# Patient Record
Sex: FEMALE
[De-identification: names, ages, dates, MRNs, and addresses within clinical notes are randomized per-mention and may not be internally consistent; named-entity substitution may affect disease eponyms.]

---

## 2017-12-26 ENCOUNTER — HOSPITAL ENCOUNTER (OUTPATIENT)
Dept: HOSPITAL 5 - LAB | Age: 27
Discharge: HOME | End: 2017-12-26
Attending: OBSTETRICS & GYNECOLOGY
Payer: MEDICAID

## 2017-12-26 VITALS — SYSTOLIC BLOOD PRESSURE: 126 MMHG | DIASTOLIC BLOOD PRESSURE: 67 MMHG

## 2017-12-26 DIAGNOSIS — Z87.891: ICD-10-CM

## 2017-12-26 DIAGNOSIS — Z3A.30: ICD-10-CM

## 2017-12-26 DIAGNOSIS — O36.0130: Primary | ICD-10-CM

## 2017-12-26 PROCEDURE — 86901 BLOOD TYPING SEROLOGIC RH(D): CPT

## 2017-12-26 PROCEDURE — 86900 BLOOD TYPING SEROLOGIC ABO: CPT

## 2017-12-26 PROCEDURE — 86850 RBC ANTIBODY SCREEN: CPT

## 2017-12-26 PROCEDURE — 96372 THER/PROPH/DIAG INJ SC/IM: CPT

## 2018-03-08 ENCOUNTER — HOSPITAL ENCOUNTER (OUTPATIENT)
Dept: HOSPITAL 5 - TRG | Age: 28
LOS: 1 days | Discharge: HOME | End: 2018-03-09
Attending: OBSTETRICS & GYNECOLOGY
Payer: MEDICAID

## 2018-03-08 DIAGNOSIS — Z3A.40: ICD-10-CM

## 2018-03-08 DIAGNOSIS — O48.0: Primary | ICD-10-CM

## 2018-03-08 PROCEDURE — 96360 HYDRATION IV INFUSION INIT: CPT

## 2018-03-08 PROCEDURE — 59025 FETAL NON-STRESS TEST: CPT

## 2018-03-09 VITALS — SYSTOLIC BLOOD PRESSURE: 122 MMHG | DIASTOLIC BLOOD PRESSURE: 77 MMHG

## 2018-03-13 ENCOUNTER — HOSPITAL ENCOUNTER (INPATIENT)
Dept: HOSPITAL 5 - LD | Age: 28
LOS: 2 days | Discharge: HOME | End: 2018-03-15
Attending: OBSTETRICS & GYNECOLOGY | Admitting: OBSTETRICS & GYNECOLOGY
Payer: MEDICAID

## 2018-03-13 DIAGNOSIS — O48.0: Primary | ICD-10-CM

## 2018-03-13 DIAGNOSIS — J45.909: ICD-10-CM

## 2018-03-13 DIAGNOSIS — Z3A.41: ICD-10-CM

## 2018-03-13 DIAGNOSIS — A60.00: ICD-10-CM

## 2018-03-13 LAB
BASOPHILS # (AUTO): 0.1 K/MM3 (ref 0–0.1)
BASOPHILS NFR BLD AUTO: 0.7 % (ref 0–1.8)
EOSINOPHIL # BLD AUTO: 0.3 K/MM3 (ref 0–0.4)
EOSINOPHIL NFR BLD AUTO: 2.9 % (ref 0–4.3)
HCT VFR BLD CALC: 31.7 % (ref 30.3–42.9)
HGB BLD-MCNC: 10.1 GM/DL (ref 10.1–14.3)
LYMPHOCYTES # BLD AUTO: 1.8 K/MM3 (ref 1.2–5.4)
LYMPHOCYTES NFR BLD AUTO: 16 % (ref 13.4–35)
MCH RBC QN AUTO: 25 PG (ref 28–32)
MCHC RBC AUTO-ENTMCNC: 32 % (ref 30–34)
MCV RBC AUTO: 79 FL (ref 79–97)
MONOCYTES # (AUTO): 1 K/MM3 (ref 0–0.8)
MONOCYTES % (AUTO): 9.2 % (ref 0–7.3)
PLATELET # BLD: 221 K/MM3 (ref 140–440)
RBC # BLD AUTO: 4.03 M/MM3 (ref 3.65–5.03)

## 2018-03-13 PROCEDURE — 86592 SYPHILIS TEST NON-TREP QUAL: CPT

## 2018-03-13 PROCEDURE — 85025 COMPLETE CBC W/AUTO DIFF WBC: CPT

## 2018-03-13 PROCEDURE — 85014 HEMATOCRIT: CPT

## 2018-03-13 PROCEDURE — G0463 HOSPITAL OUTPT CLINIC VISIT: HCPCS

## 2018-03-13 PROCEDURE — 99211 OFF/OP EST MAY X REQ PHY/QHP: CPT

## 2018-03-13 PROCEDURE — 85018 HEMOGLOBIN: CPT

## 2018-03-13 PROCEDURE — A6250 SKIN SEAL PROTECT MOISTURIZR: HCPCS

## 2018-03-13 PROCEDURE — 86901 BLOOD TYPING SEROLOGIC RH(D): CPT

## 2018-03-13 PROCEDURE — 85461 HEMOGLOBIN FETAL: CPT

## 2018-03-13 PROCEDURE — 86850 RBC ANTIBODY SCREEN: CPT

## 2018-03-13 PROCEDURE — 86900 BLOOD TYPING SEROLOGIC ABO: CPT

## 2018-03-13 PROCEDURE — 36415 COLL VENOUS BLD VENIPUNCTURE: CPT

## 2018-03-13 RX ADMIN — SODIUM CHLORIDE, SODIUM ACETATE ANHYDROUS, SODIUM GLUCONATE, POTASSIUM CHLORIDE, AND MAGNESIUM CHLORIDE SCH MLS/HR: 526; 222; 502; 37; 30 INJECTION, SOLUTION INTRAVENOUS at 14:17

## 2018-03-13 RX ADMIN — BUTORPHANOL TARTRATE PRN MG: 2 INJECTION, SOLUTION INTRAMUSCULAR; INTRAVENOUS at 15:57

## 2018-03-13 RX ADMIN — SODIUM CHLORIDE, SODIUM ACETATE ANHYDROUS, SODIUM GLUCONATE, POTASSIUM CHLORIDE, AND MAGNESIUM CHLORIDE SCH MLS/HR: 526; 222; 502; 37; 30 INJECTION, SOLUTION INTRAVENOUS at 11:17

## 2018-03-13 RX ADMIN — SODIUM CHLORIDE, SODIUM ACETATE ANHYDROUS, SODIUM GLUCONATE, POTASSIUM CHLORIDE, AND MAGNESIUM CHLORIDE SCH MLS/HR: 526; 222; 502; 37; 30 INJECTION, SOLUTION INTRAVENOUS at 19:36

## 2018-03-13 RX ADMIN — BUTORPHANOL TARTRATE PRN MG: 2 INJECTION, SOLUTION INTRAMUSCULAR; INTRAVENOUS at 18:23

## 2018-03-13 NOTE — ANESTHESIA CONSULTATION
Anesthesia Consult and Med Hx


Date of service: 03/13/18





- Airway


Anesthetic Teeth Evaluation: Good


ROM Head & Neck: Adequate


Mental/Hyoid Distance: Adequate


Mallampati Class: Class I


Intubation Access Assessment: Good





- Pulmonary Exam


CTA: Yes





- Cardiac Exam


Cardiac Exam: RRR





- Pre-Operative Health Status


ASA Pre-Surgery Classification: ASA2


Proposed Anesthetic Plan: Epidural





- Pulmonary


Hx Asthma: Yes (daily rescue inhaler)


COPD: No


Hx Pneumonia: No





- Cardiovascular System


Hx Hypertension: No


Hx Coronary Artery Disease: No


Hx Heart Attack/AMI: No


Hx Angina: No


Hx Cardia Arrhythmia: No


Hx Heart Murmur: No





- Central Nervous System


Hx Seizures: No


Hx Psychiatric Problems: Yes (ANXIETY-ON ZOLOFT)





- Endocrine


Hx Renal Disease: No


Hx End Stage Renal Disease: No


Hx Hypothyroidism: No


Hx Hyperthyroidism: No





- Hematic


Hx Anemia: No


Hx Sickle Cell Disease: No





- Other Systems


Hx Alcohol Use: No

## 2018-03-13 NOTE — HISTORY AND PHYSICAL REPORT
History of Present Illness


Date of examination: 18


Date of admission: 


18 08:24





Chief complaint: 





I'm overdue


History of present illness: 





Patient is a 27 year old  who presents for post dates induction of labor at 

41 weeks gestation with EDC 3/7/18. She has had an uncomplicated prenatal 

course . She is positive for HSV.





Past History


Past Medical History: asthma, thyroid disease


Past Surgical History: no surgical history


GYN History: herpes


Family/Genetic History: none


Social history: 





- Obstetrical History


Expected Date of Delivery: 18


Actual Gestation: 40 Week(s) 6 Day(s) 


: 3


Number of Living Children: 2





Medications and Allergies


 Allergies











Allergy/AdvReac Type Severity Reaction Status Date / Time


 


No Known Allergies Allergy   Verified 17 12:23











 Home Medications











 Medication  Instructions  Recorded  Confirmed  Last Taken  Type


 


Prenatal Vit-Fe Fumar-FA [Prenatal 1 each PO QDAY #60 tablet 16 Rx





Vitamin]     


 


Budesonide/Formoterol Fumarate 2 puff INHALATION BID 17 

09:00 History





[Symbicort 80-4.5 Mcg Inhaler]    1 


 


Sertraline HCl [Zoloft] 50 mg PO DAILY 17 History


 


ALBUTEROL Inhaler [ProAir HFA 2 puff INHALATION PRN 18 

History





Inhaler]     


 


Cetirizine HCl [Zyrtec] 10 mg PO DAILY 18 Unknown History


 


Valacyclovir HCl [Valtrex] 1,000 mg PO DAILY 18 History











Active Meds: 


Active Medications





Butorphanol Tartrate (Stadol)  2 mg IV Q2H PRN


   PRN Reason: Pain , Severe (7-10)


   Last Admin: 18 18:23 Dose:  2 mg


Ephedrine Sulfate (Ephedrine Sulfate)  10 mg IV Q2M PRN


   PRN Reason: Hypotension


Ephedrine Sulfate (Ephedrine Sulfate)  10 mg IV Q2M PRN


   PRN Reason: Hypotension


Parenteral Electrolytes (Normosol-R Ph 7.4)  1,000 mls @ 125 mls/hr IV AS 

DIRECT MAYANK


   Last Admin: 18 19:36 Dose:  125 mls/hr


Oxytocin/Sodium Chloride (Pitocin/Ns 20 Unit/1000ml Drip)  20 units in 1,000 

mls @ 125 mls/hr IV AS DIRECT MAYANK


Oxytocin/Sodium Chloride (Pitocin/Ns 30 Unit/500ml)  30 units in 500 mls @ 4 mls

/hr IV TITR MAYANK; Protocol


   Last Titration: 18 15:00 Dose:  16 mls/hr, 16 mls/hr


Fentanyl/Bupivacaine/Sodium Chlor (Fentanyl-Bupiv 2 Mcg/Ml-0.125%)  200 mcg in 

100 mls @ 12 mls/hr EPIDURAL TITR MAYANK; Protocol


   Last Admin: 18 21:25 Dose:  12 mls/hr


Mineral Oil (Mineral Oil)  30 ml PO QHS PRN


   PRN Reason: Constipation


Naloxone HCl (Narcan 2 Mg/2 Ml)  0.2 mg IV Q5M PRN


   PRN Reason: Respiratory sedation


Promethazine HCl (Phenergan)  25 mg PO Q6H PRN


   PRN Reason: Nausea And Vomiting


Terbutaline Sulfate (Brethine)  0.25 mg SUB-Q ONCE PRN


   PRN Reason: Hyperstimulation/Hypertonicity


Terbutaline Sulfate (Brethine)  0.25 mg IVP ONCE PRN


   PRN Reason: Hyperstimulation/Hypertonicity











Review of Systems


All systems: negative


Constitutional: fatigue


Genitourinary: pelvic pain, contractions





- Vital Signs


Vital signs: 


 Vital Signs











Temp Resp


 


 97.8 F   18 


 


 18 09:16  18 09:16








 











Temp Pulse Resp BP Pulse Ox


 


 97 F L  68   16   127/71   100 


 


 18 21:50  18 22:29  18 21:50  18 22:29  18 22:29














- Physical Exam


Breasts: Positive: deferred


Cardiovascular: Regular rate, Normal S1, Normal S2


Lungs: Positive: Clear to auscultation, Normal air movement


Abdomen: Positive: normal appearance, soft, normal bowel sounds


Genitourinary (Female): Positive: normal external genitalia, normal perenium


Vagina: Positive: normal moisture


Uterus: Positive: normal size, normal contour





- Obstetrical


Cervical Dilatation: 2


Cervical Effacement Percentage: 30


Fetal station: -3


Uterine Contraction Pattern: Irregular


Uterine Tone Measurement Phase: Contraction





Results


Result Diagrams: 


 18 09:45





 Abnormal lab results











  18 Range/Units





  09:45 


 


WBC  11.2 H  (4.5-11.0)  K/mm3


 


MCH  25 L  (28-32)  pg


 


Mono % (Auto)  9.2 H  (0.0-7.3)  %


 


Mono #  1.0 H  (0.0-0.8)  K/mm3


 


Seg Neutrophils %  71.2 H  (40.0-70.0)  %


 


Seg Neutrophils #  8.0 H  (1.8-7.7)  K/mm3








All other labs normal.








Assessment and Plan





IUP at 40.6 for postdates induction of labor. ADmit for pitocin. AROM when 

needed. Anticipate .

## 2018-03-14 LAB
HCT VFR BLD CALC: 28.2 % (ref 30.3–42.9)
HGB BLD-MCNC: 9.1 GM/DL (ref 10.1–14.3)

## 2018-03-14 PROCEDURE — 3E0R3BZ INTRODUCTION OF ANESTHETIC AGENT INTO SPINAL CANAL, PERCUTANEOUS APPROACH: ICD-10-PCS | Performed by: OBSTETRICS & GYNECOLOGY

## 2018-03-14 PROCEDURE — 00HU33Z INSERTION OF INFUSION DEVICE INTO SPINAL CANAL, PERCUTANEOUS APPROACH: ICD-10-PCS | Performed by: OBSTETRICS & GYNECOLOGY

## 2018-03-14 RX ADMIN — IBUPROFEN SCH MG: 600 TABLET, FILM COATED ORAL at 08:10

## 2018-03-14 RX ADMIN — IBUPROFEN SCH MG: 600 TABLET, FILM COATED ORAL at 02:06

## 2018-03-14 RX ADMIN — IBUPROFEN SCH MG: 600 TABLET, FILM COATED ORAL at 23:18

## 2018-03-14 RX ADMIN — HYDROCODONE BITARTRATE AND ACETAMINOPHEN PRN EACH: 5; 325 TABLET ORAL at 11:53

## 2018-03-14 RX ADMIN — HYDROCODONE BITARTRATE AND ACETAMINOPHEN PRN EACH: 5; 325 TABLET ORAL at 03:39

## 2018-03-14 NOTE — PROCEDURE NOTE
OB Delivery Note





- Delivery


Date of Delivery: 18


Surgeon: RAJI GIVENS


Estimated blood loss: 200cc





- Vaginal


Delivery presentation: vertex


Delivery position: OA


Intrapartum events: meconium


Delivery induction: oxytocin


Delivery augmentation: pitocin


Delivery monitor: external FHT, external uterine


Route of delivery: 


Delivery placenta: spontaneous


Delivery cord: nuchal cord, 3 umbilical vessels


Episiotomy: none


Delivery laceration: none


Anesthesia: epidural


Delivery comments: 





Viable male  delivered over intact perineum with loose nuchal cord 

easily reduced on the perineum. Weight 9 pounds 1 ounce. 4108 grams. Apgars 7,

9. Infant placed on maternal abdomen. Cord clamped and cut when done pulsating. 

Placenta delivered spontaneously and intact with 3vc. No lacerations. Patient 

tolerated procedure well.





- Infant


  ** A


Apgar at 1 minute: 7


Apgar at 5 minutes: 9


Infant Gender: Male (9 pounds 1 ounce)

## 2018-03-15 VITALS — DIASTOLIC BLOOD PRESSURE: 78 MMHG | SYSTOLIC BLOOD PRESSURE: 122 MMHG

## 2018-03-15 RX ADMIN — IBUPROFEN SCH MG: 600 TABLET, FILM COATED ORAL at 05:10

## 2018-03-15 RX ADMIN — HYDROCODONE BITARTRATE AND ACETAMINOPHEN PRN EACH: 5; 325 TABLET ORAL at 10:00
